# Patient Record
Sex: FEMALE | Race: BLACK OR AFRICAN AMERICAN | NOT HISPANIC OR LATINO | Employment: STUDENT | URBAN - METROPOLITAN AREA
[De-identification: names, ages, dates, MRNs, and addresses within clinical notes are randomized per-mention and may not be internally consistent; named-entity substitution may affect disease eponyms.]

---
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
## 2020-02-28 ENCOUNTER — TELEPHONE (OUTPATIENT)
Dept: ALLERGY | Facility: CLINIC | Age: 20
End: 2020-02-28

## 2020-02-28 NOTE — TELEPHONE ENCOUNTER
Dr. Moran,    Can you please send the Grastek prescription to [facility]? Looks like the original prescription was sent to [facility]. I was trying to see if it would be cheaper for the patient to get the medication from our pharmacy.

## 2020-03-03 DIAGNOSIS — J30.89 SEASONAL ALLERGIC RHINITIS DUE TO OTHER ALLERGIC TRIGGER: ICD-10-CM

## 2020-03-03 DIAGNOSIS — L30.9 ECZEMA, UNSPECIFIED TYPE: ICD-10-CM

## 2020-03-03 DIAGNOSIS — J45.20 MILD INTERMITTENT ASTHMA WITHOUT COMPLICATION: ICD-10-CM

## 2020-03-03 DIAGNOSIS — L20.9 ATOPIC DERMATITIS, UNSPECIFIED TYPE: ICD-10-CM

## 2020-03-03 RX ORDER — TIMOTHY GRASS POLLEN ALLERGEN EXTRACT 2800 [BAU]/1
1 TABLET SUBLINGUAL DAILY
Qty: 30 TABLET | Refills: 11 | Status: SHIPPED | OUTPATIENT
Start: 2020-03-03 | End: 2020-03-18

## 2020-03-18 DIAGNOSIS — J45.20 MILD INTERMITTENT ASTHMA WITHOUT COMPLICATION: ICD-10-CM

## 2020-03-18 DIAGNOSIS — J30.89 SEASONAL ALLERGIC RHINITIS DUE TO OTHER ALLERGIC TRIGGER: ICD-10-CM

## 2020-03-18 DIAGNOSIS — L20.9 ATOPIC DERMATITIS, UNSPECIFIED TYPE: ICD-10-CM

## 2020-03-18 DIAGNOSIS — L30.9 ECZEMA, UNSPECIFIED TYPE: ICD-10-CM

## 2020-03-19 RX ORDER — TIMOTHY GRASS POLLEN ALLERGEN EXTRACT 2800 [BAU]/1
1 TABLET SUBLINGUAL DAILY
Qty: 30 TABLET | Refills: 11 | Status: SHIPPED | OUTPATIENT
Start: 2020-03-19

## 2020-03-23 ENCOUNTER — TELEPHONE (OUTPATIENT)
Dept: PHARMACY | Facility: CLINIC | Age: 20
End: 2020-03-23

## 2020-03-23 NOTE — TELEPHONE ENCOUNTER
Informed Parent Berna  that [facility] received prescription for Grastek and prior authorization is required.  [facility] will be back in touch once insurance determination is received.    
normal (ped)...

## 2020-04-08 ENCOUNTER — TELEPHONE (OUTPATIENT)
Dept: PHARMACY | Facility: CLINIC | Age: 20
End: 2020-04-08

## 2020-04-08 NOTE — TELEPHONE ENCOUNTER
Juan Manuel MCCORMACK denied as plan exclusion. Appeal attempted. Response to appeal was that patient would be sent denial and appeal instructions. Call attempt to patient to request that appeal instructions be forwarded to [facility] once received. MANJIT.

## 2020-04-21 NOTE — TELEPHONE ENCOUNTER
Call attempt 3 to obtain denial details and appeal instructions from patient. LVM. Sent [employer].

## 2020-05-18 ENCOUNTER — TELEPHONE (OUTPATIENT)
Dept: PHARMACY | Facility: CLINIC | Age: 20
End: 2020-05-18

## 2020-05-18 NOTE — TELEPHONE ENCOUNTER
Call to assess receipt of denial letter. Mother answered call, verified patient name and . Mother states that she had not received letter yet, but that she would try to call again. She states that she will email to [facility] as soon as she gets the letter. No other questions or concerns. Mother agreed to a follow up call in a week.     Levi Jones, PharmD  Clinical Pharmacist  [facility]  P: [phone]

## 2020-08-06 ENCOUNTER — OFFICE VISIT (OUTPATIENT)
Dept: ALLERGY | Facility: CLINIC | Age: 20
End: 2020-08-06
Payer: COMMERCIAL

## 2020-08-06 DIAGNOSIS — L20.9 ATOPIC DERMATITIS, UNSPECIFIED TYPE: Primary | ICD-10-CM

## 2020-08-06 DIAGNOSIS — J30.89 SEASONAL ALLERGIC RHINITIS DUE TO OTHER ALLERGIC TRIGGER: ICD-10-CM

## 2020-08-06 DIAGNOSIS — Z88.9 DRUG ALLERGY: ICD-10-CM

## 2020-08-06 DIAGNOSIS — J45.20 MILD INTERMITTENT ASTHMA WITHOUT COMPLICATION: ICD-10-CM

## 2020-08-06 PROCEDURE — 99213 PR OFFICE/OUTPT VISIT, EST, LEVL III, 20-29 MIN: ICD-10-PCS | Mod: 95,,, | Performed by: ALLERGY & IMMUNOLOGY

## 2020-08-06 PROCEDURE — 99213 OFFICE O/P EST LOW 20 MIN: CPT | Mod: 95,,, | Performed by: ALLERGY & IMMUNOLOGY

## 2020-08-06 NOTE — PROGRESS NOTES
"The patient location is: home  The chief complaint leading to consultation is: eczema, allergies    Visit type: audiovisual    Face to Face time with patient: 15  25 minutes of total time spent on the encounter, which includes face to face time and non-face to face time preparing to see the patient (eg, review of tests), Obtaining and/or reviewing separately obtained history, Documenting clinical information in the electronic or other health record, Independently interpreting results (not separately reported) and communicating results to the patient/family/caregiver, or Care coordination (not separately reported).     Each patient to whom he or she provides medical services by telemedicine is:  (1) informed of the relationship between the physician and patient and the respective role of any other health care provider with respect to management of the patient; and (2) notified that he or she may decline to receive medical services by telemedicine and may withdraw from such care at any time.    Notes:   ALLERGY & IMMUNOLOGY CLINIC -  FOLLOW UP     HISTORY OF PRESENT ILLNESS     Patient ID: Jayme Kraus is a 20 y.o. female    CC: follow up    HPI: 21 yo girl with a long history of atopic dermatitis that is augmented by stress and known allergens (alejandra grass), presents for follow up for atopic dermatitis and allergic rhinitis.       Today she presents by herself for a virtual visit.  She is doing well since she was last seen in December 2019.  She moved home from college due to the pandemic, and she has maintained good control of her eczema.  She has found that she can tolerate dove soap instead of homemade goats milk soap that she has used previously, which is must less expensive. She treats flares with use of topical hydrocortisone ("Cortisone 10").  She also uses eucerin and aquaphor liberally.      She is currently taking zyrtec daily. She has completed her course of grastek and stopped taking this June 2020. "      She has a history of intermittent asthma for which she has not had to use her inhaler since she had a sinus infection in 2019.      REVIEW OF SYSTEMS     CONST: no F/C/NS, no unintentional weight changes  NEURO: no H/A, no weakness, no paresthesias  EYES: no discharge, no pruritus, no erythema  EARS: no hearing loss, no sensation of fullness  NOSE: no congestion, no rhinorrhea, no itching, no sneezing  PULM: no SOB, no wheezing, no cough  CV: no CP, no palpitations, no leg swelling  GI: no dysphagia, no heartburn, no pain, no N/V/D  MSK: no joint pain, no muscle pain  DERM: + rashes, no skin breaks     MEDICAL HISTORY      MedHx: eczema, asthma   SurgHx:  No significant surgical history                     SocHx: Attending college, will do the upcoming  remotely.  She is very involved with her [employer] major and is focused on school.  She completed the year with a 4.0 GPA. She is not around any pets or smoking at school.   FamHx: paternal grandmother  of colon cancer. Paternal grandmother has CHF. Maternal grandmother has CHF, and stroke. Father has eczema.              Allergies: multiple drug allergies  Medications: MAR reviewed     H/o Asthma: see above  H/o Eczema: (+) see above  Oral Allergy: n/a  Food Allergy:  Has lip swelling when she has mussels. Avoids some foods because she thinks they flare her skin.   Venom Allergy: denies  Latex Allergy: denies  Other Allergies: denies  Env/Occ: denies any environmental or occupational exposures      PHYSICAL EXAM      No vital signs were taken during this virtual visit.   GEN: Alert, cooperative, normal speech  HEENT: No ocular discharge, no nasal discharge, no hoarseness  PULM: Normal work of breathing, no cough  PSYCH: appropriate affect  DERM: no obvious rashes      ALLERGEN TESTING      Immunocaps: 2016 - significant positivity to grasses (alejandra and bermuda were the ones we measured). [place] of outdoor allergens with much  lower values of specific IgE, but  is clearly grasses.       ASSESSMENT & PLAN      Jayme Kraus is a 19 y.o. female with      1. ongoing atopic dermatitis, well-controlled, currently treated with hydrocortisone, aquaphor, and eucerin  2. Allergic rhinitis with grass as driving allergen  3. Intermittent asthma  4. History of antibiotic allergy, but just completed a course of amoxicillin  5. Recent sinusitis, currently improving s/p treatment with antibiotics and prednisone     Plan:   Completed about a three year course of gratek (8/2017 - 6/2020)   Continue zyrtec and flonase for nasal symptoms  Continue hydrocortisone, frequent use of emollients.   Post-pandemic, need to plan evaluation of medication allergies     Follow up: 1 year, sooner if needed     Brenna Moran MD  Allergy/Immunology

## 2020-09-25 ENCOUNTER — PATIENT MESSAGE (OUTPATIENT)
Dept: ALLERGY | Facility: CLINIC | Age: 20
End: 2020-09-25

## 2020-09-29 DIAGNOSIS — L70.9 ACNE, UNSPECIFIED ACNE TYPE: Primary | ICD-10-CM

## 2020-11-09 ENCOUNTER — PATIENT MESSAGE (OUTPATIENT)
Dept: DERMATOLOGY | Facility: CLINIC | Age: 20
End: 2020-11-09

## 2020-11-12 ENCOUNTER — PATIENT MESSAGE (OUTPATIENT)
Dept: DERMATOLOGY | Facility: CLINIC | Age: 20
End: 2020-11-12

## 2020-11-12 ENCOUNTER — OFFICE VISIT (OUTPATIENT)
Dept: DERMATOLOGY | Facility: CLINIC | Age: 20
End: 2020-11-12
Payer: COMMERCIAL

## 2020-11-12 DIAGNOSIS — L70.0 ACNE VULGARIS: Primary | ICD-10-CM

## 2020-11-12 PROCEDURE — 99202 OFFICE O/P NEW SF 15 MIN: CPT | Mod: 95,,, | Performed by: DERMATOLOGY

## 2020-11-12 PROCEDURE — 99202 PR OFFICE/OUTPT VISIT, NEW, LEVL II, 15-29 MIN: ICD-10-PCS | Mod: 95,,, | Performed by: DERMATOLOGY

## 2020-11-12 RX ORDER — TRETINOIN 0.5 MG/G
CREAM TOPICAL
Qty: 45 G | Refills: 5 | Status: SHIPPED | OUTPATIENT
Start: 2020-11-12

## 2020-11-12 NOTE — PROGRESS NOTES
Subjective:       Patient ID:  Jayme Kraus is a 20 y.o. female who presents for No chief complaint on file.    Initial visit  C/o acne on face, worsened in th epast year  Black heads and inflammatory lesions, pus bumps  Face only, chest and back are fine per patient    On medroxyprogesterone 10 days per month PO      Current Outpatient Medications:     albuterol (PROVENTIL/VENTOLIN HFA) 90 mcg/actuation inhaler, Inhale 2 puffs into the lungs every 4 (four) hours as needed for Wheezing., Disp: 18 g, Rfl: 3    ammonium lactate 12 % Crea, Apply 1 application topically 2 (two) times daily., Disp: 385 g, Rfl: 11    benzonatate (TESSALON) 200 MG capsule, Take 200 mg by mouth 3 (three) times daily as needed for Cough., Disp: , Rfl:     blood-glucose meter kit, Use as instructed, Disp: , Rfl:     calcium acetate-aluminum sulf 952-1,347 mg 952-1,347 mg PwPk, Apply 1 packet topically., Disp: , Rfl:     cetirizine (ZYRTEC) 10 MG tablet, Take 10 mg by mouth once daily., Disp: , Rfl:     COCONUT OIL TOP, Apply topically., Disp: , Rfl:     epinephrine (EPIPEN 2-SHAHRIAR) 0.3 mg/0.3 mL AtIn, Inject 0.3 mLs (0.3 mg total) into the muscle once., Disp: 0.3 mL, Rfl: 0    fluticasone propionate (FLONASE) 50 mcg/actuation nasal spray, 2 sprays (100 mcg total) by Each Nare route once daily., Disp: 16 g, Rfl: 11    GRASTEK 2,800 BAU Subl, Place 1 tablet under the tongue once daily., Disp: 30 tablet, Rfl: 11    hydrocortisone (CORTISONE, HYDROCORTISONE,) 1 % cream, Apply topically 2 (two) times daily., Disp: 454 g, Rfl: 11    ibuprofen (ADVIL,MOTRIN) 200 MG tablet, Take 200 mg by mouth every 6 (six) hours as needed for Pain., Disp: , Rfl:     medroxyPROGESTERone (PROVERA) 10 MG tablet, Take 1 tablet (10 mg total) by mouth once daily., Disp: 30 tablet, Rfl: 3    mineral oil-hydrophil petrolat (AQUAPHOR) Oint, Apply topically 4 (four) times daily as needed., Disp: , Rfl:     mometasone (NASONEX) 50 mcg/actuation nasal spray, 2  sprays by Nasal route., Disp: , Rfl:     oxymetazoline (AFRIN) 0.05 % nasal spray, 2 sprays by Nasal route 2 (two) times daily., Disp: , Rfl:     predniSONE (DELTASONE) 20 MG tablet, Take 20 mg by mouth once daily., Disp: , Rfl:     white petrolatum-mineral oil (EUCERIN) Crea, Apply topically as needed., Disp: , Rfl:     The patient location is: home  The chief complaint leading to consultation is: acne    Visit type: audiovisual    Face to Face time with patient: 15min  20 minutes of total time spent on the encounter, which includes face to face time and non-face to face time preparing to see the patient (eg, review of tests), Obtaining and/or reviewing separately obtained history, Documenting clinical information in the electronic or other health record, Independently interpreting results (not separately reported) and communicating results to the patient/family/caregiver, or Care coordination (not separately reported).     Each patient to whom he or she provides medical services by telemedicine is:  (1) informed of the relationship between the physician and patient and the respective role of any other health care provider with respect to management of the patient; and (2) notified that he or she may decline to receive medical services by telemedicine and may withdraw from such care at any time.            Review of Systems   Constitutional: Negative for fever and chills.   Respiratory: Negative for cough and shortness of breath.         Objective:    Physical Exam   Constitutional: She appears well-developed and well-nourished. No distress.   Neurological: She is alert and oriented to person, place, and time. She is not disoriented.   Psychiatric: She has a normal mood and affect.   Skin:   Areas Examined (abnormalities noted in diagram):   Head / Face Inspection Performed  Neck Inspection Performed              Diagram Legend     Erythematous scaling macule/papule c/w actinic keratosis       Vascular papule  c/w angioma      Pigmented verrucoid papule/plaque c/w seborrheic keratosis      Yellow umbilicated papule c/w sebaceous hyperplasia      Irregularly shaped tan macule c/w lentigo     1-2 mm smooth white papules consistent with Milia      Movable subcutaneous cyst with punctum c/w epidermal inclusion cyst      Subcutaneous movable cyst c/w pilar cyst      Firm pink to brown papule c/w dermatofibroma      Pedunculated fleshy papule(s) c/w skin tag(s)      Evenly pigmented macule c/w junctional nevus     Mildly variegated pigmented, slightly irregular-bordered macule c/w mildly atypical nevus      Flesh colored to evenly pigmented papule c/w intradermal nevus       Pink pearly papule/plaque c/w basal cell carcinoma      Erythematous hyperkeratotic cursted plaque c/w SCC      Surgical scar with no sign of skin cancer recurrence      Open and closed comedones      Inflammatory papules and pustules      Verrucoid papule consistent consistent with wart     Erythematous eczematous patches and plaques     Dystrophic onycholytic nail with subungual debris c/w onychomycosis     Umbilicated papule    Erythematous-base heme-crusted tan verrucoid plaque consistent with inflamed seborrheic keratosis     Erythematous Silvery Scaling Plaque c/w Psoriasis     See annotation                  Assessment / Plan:        Acne vulgaris  -     tretinoin (RETIN-A) 0.05 % cream; Thin film to entire face at bedtime  Dispense: 45 g; Refill: 5    comedonal  Trial of above  On medroxyprogesterone for cramps and menorrhagia, probably contributes  Wears mask 6hrs per day    Discussed role of diet, hormones, contraceptive method  Anticipated treatment course and OTC products to supplement treatment (BP and sal acid washes)  Handout in AVS         Follow up in about 3 months (around 2/12/2021).

## 2020-11-12 NOTE — PATIENT INSTRUCTIONS
Acne Treatment     Retinoids (e.g. adapalene, tretinoin, tazarotene) are vitamin A derivatives that are the mainstay of acne therapy. The skin often becomes dry, red, or irritated when first using them--this is a normal period of adjustment.   o Use only a pea-sized amount for the entire face to avoid excess irritation.   o If your skin is sensitive, begin by using the medication two nights per week or every other night for the first couple of months until your skin adjusts.   o Use as much oil-free moisturizer as needed to help your skin adjust to the retinoid.   There is no miracle, overnight cure for acne. It may take 6-8 weeks to start seeing some improvement, and you should continue to improve over the following months. It is important that you keep your follow up appointments so that any medication changes can be made if necessary.   Your acne may get worse before it gets better. This is normal! Just hang in there, incorporate the meds into your daily routine, and trust that the medication will work.    Do not scrub your face. Aggressive scrubbing can make acne worse.   Do not pick or squeeze your pimples, as this will cause scarring. Acne is temporary, but scars are permanent.   Antibiotics: Antibiotics are sometimes prescribed to decrease acne by reducing inflammation. They are not a good long-term solution; they have side effects as all meds do; and they should always be used along with the topical treatments that are recommended.   Waxing: Stop using the retinoid 1 week prior to any waxing, as skin is more likely to tear.   Diet: Avoid eating foods with a high glycemic load/index (high sugar, simple carbs) which can worsen acne. Also, avoid drinking a lot of skim or low fat milk, and avoid the whey protein found in most protein shakes and bars, as these can also worsen acne.   Makeup: Use only oil-free, non-comedogenic makeup. Brands to consider include Neutrogena, Tarte, Bare Minerals, Safia  [place], Maru Evangelina, Clinique. (Avoid MAC.)   For female patients: Discontinue all oral and topical acne medications if you become pregnant or are planning to become pregnant. Notify our office, and we will direct you to medications that are safe to use during pregnancy.    Morning acne regimen:  ?  1. Wash face with benzoyl peroxide cleanser in the shower. See below for suggestions.  ?  2. Apply a moisturizer broad-spectrum sunscreen with SPF 30 or higher.    Evening acne regimen:  ?  1. Wash face with salicylic acid cleanser. See below for suggestions.  ?  2. Apply a pea-sized amount of tretinoin cream (retinoid) to the entire face.  ?  4. Apply a fragrance-free moisturizer, such as CeraVe PM lotion.    Cleanser options:  o Gentle cleansers: CeraVe foaming wash, CeraVe hydrating cleanser, Neutrogena Ultra Gentle cleanser, Cetaphil cleanser  o Benzoyl peroxide (2-5%): PanOxyl 4% Creamy Wash, Neutrogena Clear Pore Cleanser/Mask, cerave acne foaming cream cleanser             *Note that benzoyl peroxide can bleach towels, sheets, and clothing if not rinsed well from the skin. May be best to keep in the shower.*  o Salicylic acid (0.5-2%): CeraVe Renewing SA Cleanser, Neutrogena Oil-Free Acne Wash, Neutrogena Acne Proofing Gel Cleanser

## 2022-05-05 NOTE — TELEPHONE ENCOUNTER
----- Message from Sofia Edmonds sent at 8/1/2018  3:16 PM CDT -----  Contact: [phone]  Patient's dad stated he is returning a call from nurse . Please call and advise, Thanks   
See portal message. Looks like Leatha HEBERT Has been in contact with parents.  
84.8
Helical Rim Text: The closure involved the helical rim.

## 2023-03-03 ENCOUNTER — TELEPHONE (OUTPATIENT)
Dept: ALLERGY | Facility: CLINIC | Age: 23
End: 2023-03-03
Payer: COMMERCIAL

## 2023-03-03 NOTE — TELEPHONE ENCOUNTER
I tried to return call to patient. No answer, message left for patient to return our call regarding an appointment.

## 2023-03-03 NOTE — TELEPHONE ENCOUNTER
"----- Message from Inessa Ramirez sent at 3/3/2023 12:19 PM CST -----  Regarding: Appointment  "Type:  Patient Call Back    Who Called:PT    What is the reqeust in detail:Requesting call back to schedule an appointment. Please advise    Can the clinic reply by [facility]?no    Best Call Back Number:[phone]      Additional Information:            "

## 2023-04-11 ENCOUNTER — OFFICE VISIT (OUTPATIENT)
Dept: DERMATOLOGY | Facility: CLINIC | Age: 23
End: 2023-04-11
Payer: COMMERCIAL

## 2023-04-11 VITALS — WEIGHT: 154 LBS | HEIGHT: 67 IN | BODY MASS INDEX: 24.17 KG/M2

## 2023-04-11 DIAGNOSIS — L70.0 ACNE VULGARIS: Primary | ICD-10-CM

## 2023-04-11 DIAGNOSIS — L81.9 DYSPIGMENTATION: ICD-10-CM

## 2023-04-11 PROCEDURE — 99214 PR OFFICE/OUTPT VISIT, EST, LEVL IV, 30-39 MIN: ICD-10-PCS | Mod: S$GLB,,, | Performed by: STUDENT IN AN ORGANIZED HEALTH CARE EDUCATION/TRAINING PROGRAM

## 2023-04-11 PROCEDURE — 99214 OFFICE O/P EST MOD 30 MIN: CPT | Mod: S$GLB,,, | Performed by: STUDENT IN AN ORGANIZED HEALTH CARE EDUCATION/TRAINING PROGRAM

## 2023-04-11 PROCEDURE — 99999 PR PBB SHADOW E&M-EST. PATIENT-LVL V: CPT | Mod: PBBFAC,,, | Performed by: STUDENT IN AN ORGANIZED HEALTH CARE EDUCATION/TRAINING PROGRAM

## 2023-04-11 PROCEDURE — 99999 PR PBB SHADOW E&M-EST. PATIENT-LVL V: ICD-10-PCS | Mod: PBBFAC,,, | Performed by: STUDENT IN AN ORGANIZED HEALTH CARE EDUCATION/TRAINING PROGRAM

## 2023-04-11 RX ORDER — TRETINOIN 0.5 MG/G
CREAM TOPICAL NIGHTLY
Qty: 45 G | Refills: 1 | Status: SHIPPED | OUTPATIENT
Start: 2023-04-11

## 2023-04-11 RX ORDER — CLINDAMYCIN AND BENZOYL PEROXIDE 10; 50 MG/G; MG/G
GEL TOPICAL EVERY MORNING
Qty: 50 G | Refills: 1 | Status: SHIPPED | OUTPATIENT
Start: 2023-04-11 | End: 2023-10-17 | Stop reason: SDUPTHER

## 2023-04-11 NOTE — PROGRESS NOTES
Subjective:      Patient ID:  Jayme Kraus is a 23 y.o. female who presents for   Chief Complaint   Patient presents with    Acne     Face,      LOV- 20-Pb- virtual    Here today for acne on face since high school  Using OTC products-cerave and clinique foaming cleanser  Was using tretinoin but has - was helpful    She takes provera.     When she took doxycycline her stomach was upset.       Has no hx of NMSC  Has no fhx of MM      Review of Systems   Constitutional:  Negative for fever and chills.   Skin:  Negative for itching, rash and dry skin.     Objective:   Physical Exam     Diagram Legend     Erythematous scaling macule/papule c/w actinic keratosis       Vascular papule c/w angioma      Pigmented verrucoid papule/plaque c/w seborrheic keratosis      Yellow umbilicated papule c/w sebaceous hyperplasia      Irregularly shaped tan macule c/w lentigo     1-2 mm smooth white papules consistent with Milia      Movable subcutaneous cyst with punctum c/w epidermal inclusion cyst      Subcutaneous movable cyst c/w pilar cyst      Firm pink to brown papule c/w dermatofibroma      Pedunculated fleshy papule(s) c/w skin tag(s)      Evenly pigmented macule c/w junctional nevus     Mildly variegated pigmented, slightly irregular-bordered macule c/w mildly atypical nevus      Flesh colored to evenly pigmented papule c/w intradermal nevus       Pink pearly papule/plaque c/w basal cell carcinoma      Erythematous hyperkeratotic cursted plaque c/w SCC      Surgical scar with no sign of skin cancer recurrence      Open and closed comedones      Inflammatory papules and pustules      Verrucoid papule consistent consistent with wart     Erythematous eczematous patches and plaques     Dystrophic onycholytic nail with subungual debris c/w onychomycosis     Umbilicated papule    Erythematous-base heme-crusted tan verrucoid plaque consistent with inflamed seborrheic keratosis     Erythematous Silvery Scaling  Plaque c/w Psoriasis     See annotation      Assessment / Plan:        Acne vulgaris  Dyspigmentation  -     clindamycin-benzoyl peroxide (BENZACLIN) gel; Apply topically every morning.  Dispense: 50 g; Refill: 1  -     tretinoin (RETIN-A) 0.05 % cream; Apply topically every evening.  Dispense: 45 g; Refill: 1  Discussed topicals alone vs. Spironolactone vs. Isotretinoin, she would like to start with topicals only  - reviewed side effects of tretinoin including erythema, peeling/scaling, dryness, burning, pruritus, photosensitivity, temporary worsening of acne. Reviewed that skin irritation consisting of erythema and peeling may occur during the first month of treatment. If this occurs instructed to use moisturizer and decrease tretinoin use to 3 nights per week until side effects subside and then increase use to daily as tolerated.   Hand out on acne given  Stop cerave SA   Start cerave hydrating cleanser  Strict sun protection with cerave tinted mineral   Medroxyprogesterone may be worsening acne             No follow-ups on file.

## 2023-04-11 NOTE — PATIENT INSTRUCTIONS

## 2023-10-17 ENCOUNTER — OFFICE VISIT (OUTPATIENT)
Dept: DERMATOLOGY | Facility: CLINIC | Age: 23
End: 2023-10-17
Payer: COMMERCIAL

## 2023-10-17 DIAGNOSIS — L81.9 DYSPIGMENTATION: Primary | ICD-10-CM

## 2023-10-17 DIAGNOSIS — L20.84 INTRINSIC ATOPIC DERMATITIS: ICD-10-CM

## 2023-10-17 DIAGNOSIS — L70.0 ACNE VULGARIS: ICD-10-CM

## 2023-10-17 PROCEDURE — 99214 OFFICE O/P EST MOD 30 MIN: CPT | Mod: S$GLB,,, | Performed by: STUDENT IN AN ORGANIZED HEALTH CARE EDUCATION/TRAINING PROGRAM

## 2023-10-17 PROCEDURE — 99214 PR OFFICE/OUTPT VISIT, EST, LEVL IV, 30-39 MIN: ICD-10-PCS | Mod: S$GLB,,, | Performed by: STUDENT IN AN ORGANIZED HEALTH CARE EDUCATION/TRAINING PROGRAM

## 2023-10-17 RX ORDER — CLINDAMYCIN AND BENZOYL PEROXIDE 10; 50 MG/G; MG/G
GEL TOPICAL EVERY MORNING
Qty: 50 G | Refills: 1 | Status: SHIPPED | OUTPATIENT
Start: 2023-10-17 | End: 2024-10-16

## 2023-10-17 RX ORDER — BUSPIRONE HYDROCHLORIDE 5 MG/1
5 TABLET ORAL 2 TIMES DAILY PRN
COMMUNITY
Start: 2023-10-13

## 2023-10-17 RX ORDER — TRIAMCINOLONE ACETONIDE 0.25 MG/G
OINTMENT TOPICAL
Qty: 80 G | Refills: 1 | Status: SHIPPED | OUTPATIENT
Start: 2023-10-17

## 2023-10-17 RX ORDER — TRETINOIN 0.25 MG/G
CREAM TOPICAL NIGHTLY
Qty: 20 G | Refills: 3 | Status: SHIPPED | OUTPATIENT
Start: 2023-10-17

## 2023-10-17 NOTE — PROGRESS NOTES
Subjective:      Patient ID:  Jayme Kraus is a 23 y.o. female who presents for   Chief Complaint   Patient presents with    Acne     Follow up and eczema     LOV 04/11/2023    Patient coming in today for follow up on acne. She never picked up her tretinoin 0.05% cream but has been using clindamycin- benzoyl peroxide gel every morning. Her acne is clearing but now she has dark spots. She is using cerave AM sunscreen. It has a white tint to it.   She also wants to discuss eczema. Has been followed for years by allergy for eczema and allergic rhinitis. Had three year course of gratek (8/2017 - 6/2020)     She is currently using cerave lotion.   She does not use shave cream- uses coconut oil      Derm Hx  Denies Phx NMSC  Denies Fhx MM        Review of Systems   Constitutional:  Negative for fever, chills and fatigue.   Respiratory:  Negative for cough and shortness of breath.    Gastrointestinal:  Negative for nausea, vomiting and diarrhea.   Skin:  Positive for daily sunscreen use and activity-related sunscreen use.       Objective:   Physical Exam   Constitutional: She appears well-developed and well-nourished.   Neurological: She is alert and oriented to person, place, and time.   Psychiatric: She has a normal mood and affect.   Skin:   Areas Examined (abnormalities noted in diagram):   Head / Face Inspection Performed  RUE Inspected  LUE Inspection Performed  RLE Inspected  LLE Inspection Performed                 Diagram Legend     Erythematous scaling macule/papule c/w actinic keratosis       Vascular papule c/w angioma      Pigmented verrucoid papule/plaque c/w seborrheic keratosis      Yellow umbilicated papule c/w sebaceous hyperplasia      Irregularly shaped tan macule c/w lentigo     1-2 mm smooth white papules consistent with Milia      Movable subcutaneous cyst with punctum c/w epidermal inclusion cyst      Subcutaneous movable cyst c/w pilar cyst      Firm pink to brown papule c/w dermatofibroma       Pedunculated fleshy papule(s) c/w skin tag(s)      Evenly pigmented macule c/w junctional nevus     Mildly variegated pigmented, slightly irregular-bordered macule c/w mildly atypical nevus      Flesh colored to evenly pigmented papule c/w intradermal nevus       Pink pearly papule/plaque c/w basal cell carcinoma      Erythematous hyperkeratotic cursted plaque c/w SCC      Surgical scar with no sign of skin cancer recurrence      Open and closed comedones      Inflammatory papules and pustules      Verrucoid papule consistent consistent with wart     Erythematous eczematous patches and plaques     Dystrophic onycholytic nail with subungual debris c/w onychomycosis     Umbilicated papule    Erythematous-base heme-crusted tan verrucoid plaque consistent with inflamed seborrheic keratosis     Erythematous Silvery Scaling Plaque c/w Psoriasis     See annotation      Assessment / Plan:        Dyspigmentation  Acne vulgaris  -     tretinoin (RETIN-A) 0.025 % cream; Apply topically every evening.  Dispense: 20 g; Refill: 3  -     clindamycin-benzoyl peroxide (BENZACLIN) gel; Apply topically every morning.  Dispense: 50 g; Refill: 1  Switch to the neutrogena mineral UV tinted sunscreen - pick color that matches skin tone  Start tretinoin with moisturizer   - reviewed side effects of tretinoin including erythema, peeling/scaling, dryness, burning, pruritus, photosensitivity, temporary worsening of acne. Reviewed that skin irritation consisting of erythema and peeling may occur during the first month of treatment. If this occurs instructed to use moisturizer and decrease tretinoin use to 3 nights per week until side effects subside and then increase use to daily as tolerated.     Intrinsic atopic dermatitis  -     triamcinolone acetonide 0.025% (KENALOG) 0.025 % Oint; Use on AA twice daily x 10 days  Dispense: 80 g; Refill: 1  Arms and legs today, mild- moderate, lots of dyspigmentation  Switch from a lotion to cerave or  vanicream moisturizing cream at least once daily  Use tac BID x 10 days for flares  Recommend using shave cream and moisturizing immediately after shaving             No follow-ups on file.

## 2023-10-17 NOTE — PATIENT INSTRUCTIONS
Recommend using a shave cream in thick lather before shaving legs  Moisturize right after with cerave or vanicream moisturizing cream    When eczema is flaring apply triamcinolone ointment twice daily x 10 days when moisturizer on top

## 2024-03-11 ENCOUNTER — TELEPHONE (OUTPATIENT)
Dept: DERMATOLOGY | Facility: CLINIC | Age: 24
End: 2024-03-11
Payer: COMMERCIAL

## 2024-03-11 NOTE — TELEPHONE ENCOUNTER
Returned call to patient, patient scheduled.     ----- Message from Tami Vail sent at 3/11/2024 12:20 PM CDT -----  Regarding: virtual apt needed  Contact: patient  Type:  Sooner Appointment Request    Caller is requesting a sooner appointment.  Caller declined first available appointment listed below.  Caller will not accept being placed on the waitlist and is requesting a message be sent to doctor.    Name of Caller:  patient  When is the first available appointment?    Symptoms:  eczema  Would the patient rather a call back or a response via [facility]?   Best Call Back Number:  [phone]  Additional Information:  seeking virtual call to advise

## 2024-04-16 ENCOUNTER — OFFICE VISIT (OUTPATIENT)
Dept: DERMATOLOGY | Facility: CLINIC | Age: 24
End: 2024-04-16
Payer: COMMERCIAL

## 2024-04-16 DIAGNOSIS — L70.0 ACNE VULGARIS: ICD-10-CM

## 2024-04-16 DIAGNOSIS — L28.0 LICHEN SIMPLEX CHRONICUS: Primary | ICD-10-CM

## 2024-04-16 DIAGNOSIS — L20.84 INTRINSIC ECZEMA: ICD-10-CM

## 2024-04-16 DIAGNOSIS — L81.9 HYPERPIGMENTATION: ICD-10-CM

## 2024-04-16 PROCEDURE — 99214 OFFICE O/P EST MOD 30 MIN: CPT | Mod: S$GLB,,, | Performed by: STUDENT IN AN ORGANIZED HEALTH CARE EDUCATION/TRAINING PROGRAM

## 2024-04-16 RX ORDER — CLOBETASOL PROPIONATE 0.5 MG/G
OINTMENT TOPICAL 2 TIMES DAILY
Qty: 60 G | Refills: 0 | Status: SHIPPED | OUTPATIENT
Start: 2024-04-16

## 2024-04-16 RX ORDER — CLINDAMYCIN AND BENZOYL PEROXIDE 10; 50 MG/G; MG/G
GEL TOPICAL EVERY MORNING
Qty: 50 G | Refills: 1 | Status: SHIPPED | OUTPATIENT
Start: 2024-04-16 | End: 2025-04-16

## 2024-04-16 RX ORDER — TRETINOIN 0.25 MG/G
CREAM TOPICAL NIGHTLY
Qty: 20 G | Refills: 3 | Status: SHIPPED | OUTPATIENT
Start: 2024-04-16

## 2024-04-16 NOTE — PROGRESS NOTES
Subjective:      Patient ID:  Jayme Kraus is a 24 y.o. female who presents for   Chief Complaint   Patient presents with    Eczema     LOV 10/17/23    Patient here today for f/u on eczema. Patient states she is continuing to flare, has new area on right dorsal hand. Not itchy today but has been in the past. Used triamcinolone ointment for approx 10 days, did improve but has tried to take breaks.   Moisturizing daily.   Has minimal water exposure on hands, denies occupational exposures    Has been followed for years by allergy for eczema and allergic rhinitis. Had three year course of gratek (8/2017 - 6/2020)      She is currently using cerave lotion.   She does not use shave cream- uses coconut oil    Also f/u on acne on face. States acne has improved. Using clindamycin BP gel and tretinoin.           Review of Systems   Constitutional:  Negative for fever, chills and fatigue.   Respiratory:  Negative for cough and shortness of breath.    Gastrointestinal:  Negative for nausea and vomiting.   Skin:  Positive for itching and rash.       Objective:   Physical Exam   Constitutional: She appears well-developed and well-nourished.   Neurological: She is alert and oriented to person, place, and time.   Psychiatric: She has a normal mood and affect.   Skin:   Areas Examined (abnormalities noted in diagram):   Head / Face Inspection Performed  RUE Inspected  LUE Inspection Performed  RLE Inspected  LLE Inspection Performed                 Diagram Legend     Erythematous scaling macule/papule c/w actinic keratosis       Vascular papule c/w angioma      Pigmented verrucoid papule/plaque c/w seborrheic keratosis      Yellow umbilicated papule c/w sebaceous hyperplasia      Irregularly shaped tan macule c/w lentigo     1-2 mm smooth white papules consistent with Milia      Movable subcutaneous cyst with punctum c/w epidermal inclusion cyst      Subcutaneous movable cyst c/w pilar cyst      Firm pink to brown papule c/w  dermatofibroma      Pedunculated fleshy papule(s) c/w skin tag(s)      Evenly pigmented macule c/w junctional nevus     Mildly variegated pigmented, slightly irregular-bordered macule c/w mildly atypical nevus      Flesh colored to evenly pigmented papule c/w intradermal nevus       Pink pearly papule/plaque c/w basal cell carcinoma      Erythematous hyperkeratotic cursted plaque c/w SCC      Surgical scar with no sign of skin cancer recurrence      Open and closed comedones      Inflammatory papules and pustules      Verrucoid papule consistent consistent with wart     Erythematous eczematous patches and plaques     Dystrophic onycholytic nail with subungual debris c/w onychomycosis     Umbilicated papule    Erythematous-base heme-crusted tan verrucoid plaque consistent with inflamed seborrheic keratosis     Erythematous Silvery Scaling Plaque c/w Psoriasis     See annotation      Assessment / Plan:        Jayme was seen today for eczema.    Diagnoses and all orders for this visit:    Lichen simplex chronicus  -     clobetasol 0.05% (TEMOVATE) 0.05 % Oint; Apply topically 2 (two) times daily.  Right dorsal hand  Can use clobetasol ointment BID x 10-14 days or nightly under occlusion   Avoid scratching  - patient counseled to use topical steroids for specified period of time and on locations discussed to prevent side effects. Reviewed side effects of long-term use of topical steroids which include thinning and lightening of skin    Acne vulgaris  -     tretinoin (RETIN-A) 0.025 % cream; Apply topically every evening.  -     clindamycin-benzoyl peroxide (BENZACLIN) gel; Apply topically every morning.    Intrinsic eczema  Hyperpigmentation  Arms and legs w/ hyperpigmentation, no active outbreak other than right dorsal hand  Continue to moisturize with cerave daily   Sun protection, avoid scratching  Can use tac 0.025% cream BID x 7-10 days when flaring       PRN    No follow-ups on file.

## 2024-09-24 ENCOUNTER — TELEPHONE (OUTPATIENT)
Dept: DERMATOLOGY | Facility: CLINIC | Age: 24
End: 2024-09-24
Payer: COMMERCIAL

## 2024-09-24 NOTE — TELEPHONE ENCOUNTER
Pt contacted and appt scheduled and added to the wait list.  Pt advised to contact her PCP or go to the Urgent Care if she feels like she has an infection.  Pt voiced understanding.

## 2024-09-24 NOTE — TELEPHONE ENCOUNTER
----- Message from Elda Forte sent at 9/24/2024  8:48 AM CDT -----  Contact: Patient  Type:  Same Day Appointment Request    Caller is requesting a same day appointment.  Caller declined first available appointment listed below.    Name of Caller:Patient     When is the first available appointment?Feb 3rd    Symptoms:eczema/ new location/ possibly infected    Best Call Back Number:[phone] (home)     Additional Information: Please call to advise

## 2024-11-12 ENCOUNTER — OFFICE VISIT (OUTPATIENT)
Dept: DERMATOLOGY | Facility: CLINIC | Age: 24
End: 2024-11-12
Payer: COMMERCIAL

## 2024-11-12 DIAGNOSIS — L20.84 INTRINSIC ATOPIC DERMATITIS: ICD-10-CM

## 2024-11-12 DIAGNOSIS — L28.0 LICHEN SIMPLEX CHRONICUS: ICD-10-CM

## 2024-11-12 DIAGNOSIS — L70.0 ACNE VULGARIS: Primary | ICD-10-CM

## 2024-11-12 DIAGNOSIS — L81.9 HYPERPIGMENTATION: ICD-10-CM

## 2024-11-12 DIAGNOSIS — L01.1 SECONDARY IMPETIGINIZATION: ICD-10-CM

## 2024-11-12 PROCEDURE — 99213 OFFICE O/P EST LOW 20 MIN: CPT | Mod: S$GLB,,, | Performed by: STUDENT IN AN ORGANIZED HEALTH CARE EDUCATION/TRAINING PROGRAM

## 2024-11-12 RX ORDER — TRIAMCINOLONE ACETONIDE 0.25 MG/G
OINTMENT TOPICAL
Qty: 80 G | Refills: 1 | Status: SHIPPED | OUTPATIENT
Start: 2024-11-12

## 2024-11-12 RX ORDER — CLOBETASOL PROPIONATE 0.5 MG/G
OINTMENT TOPICAL 2 TIMES DAILY
Qty: 60 G | Refills: 3 | Status: SHIPPED | OUTPATIENT
Start: 2024-11-12

## 2024-11-12 RX ORDER — CLINDAMYCIN AND BENZOYL PEROXIDE 10; 50 MG/G; MG/G
GEL TOPICAL EVERY MORNING
Qty: 50 G | Refills: 4 | Status: SHIPPED | OUTPATIENT
Start: 2024-11-12 | End: 2025-11-12

## 2024-11-12 RX ORDER — TRETINOIN 0.25 MG/G
CREAM TOPICAL NIGHTLY
Qty: 20 G | Refills: 4 | Status: SHIPPED | OUTPATIENT
Start: 2024-11-12

## 2024-11-12 RX ORDER — CITALOPRAM 10 MG/1
10 TABLET ORAL
COMMUNITY
Start: 2024-10-30

## 2024-11-12 RX ORDER — MUPIROCIN 20 MG/G
OINTMENT TOPICAL
Qty: 22 G | Refills: 4 | Status: SHIPPED | OUTPATIENT
Start: 2024-11-12

## 2024-11-12 NOTE — PROGRESS NOTES
Subjective:      Patient ID:  Jayme Kraus is a 24 y.o. female who presents for   Chief Complaint   Patient presents with    Acne     Follow up     LOV 04/16/2024    Patient here today for f/u on eczema. Patient states that it has calmed down. States that when she get plaques she will use the clobetasol 0.05% onit for 2-3 days until it has cleared. Uses TAC on smaller plaques.   Moisturizing daily.   Has minimal water exposure on hands, denies occupational exposures     Uses cerave cream daily, no breakouts right now.     Has been followed for years by allergy for eczema and allergic rhinitis. Had three year course of gratek (8/2017 - 6/2020)      Also f/u on acne on face. States acne has improved. Using clindamycin BP gel and tretinoin.         Lichen simplex chronicus  -     clobetasol 0.05% (TEMOVATE) 0.05 % Oint; Apply topically 2 (two) times daily.  Right dorsal hand  Can use clobetasol ointment BID x 10-14 days or nightly under occlusion   Avoid scratching  - patient counseled to use topical steroids for specified period of time and on locations discussed to prevent side effects. Reviewed side effects of long-term use of topical steroids which include thinning and lightening of skin     Acne vulgaris  -     tretinoin (RETIN-A) 0.025 % cream; Apply topically every evening.  -     clindamycin-benzoyl peroxide (BENZACLIN) gel; Apply topically every morning.     Intrinsic eczema  Hyperpigmentation  Arms and legs w/ hyperpigmentation, no active outbreak other than right dorsal hand  Continue to moisturize with cerave daily   Sun protection, avoid scratching  Can use tac 0.025% cream BID x 7-10 days when flaring          Review of Systems   Constitutional:  Negative for fever, chills and fatigue.   Respiratory:  Negative for cough and shortness of breath.    Gastrointestinal:  Negative for nausea and vomiting.   Skin:  Positive for itching and rash.       Objective:   Physical Exam   Constitutional: She appears  well-developed and well-nourished.   Neurological: She is alert and oriented to person, place, and time.   Psychiatric: She has a normal mood and affect.   Skin:   Areas Examined (abnormalities noted in diagram):   Head / Face Inspection Performed  Neck Inspection Performed                 Diagram Legend     Erythematous scaling macule/papule c/w actinic keratosis       Vascular papule c/w angioma      Pigmented verrucoid papule/plaque c/w seborrheic keratosis      Yellow umbilicated papule c/w sebaceous hyperplasia      Irregularly shaped tan macule c/w lentigo     1-2 mm smooth white papules consistent with Milia      Movable subcutaneous cyst with punctum c/w epidermal inclusion cyst      Subcutaneous movable cyst c/w pilar cyst      Firm pink to brown papule c/w dermatofibroma      Pedunculated fleshy papule(s) c/w skin tag(s)      Evenly pigmented macule c/w junctional nevus     Mildly variegated pigmented, slightly irregular-bordered macule c/w mildly atypical nevus      Flesh colored to evenly pigmented papule c/w intradermal nevus       Pink pearly papule/plaque c/w basal cell carcinoma      Erythematous hyperkeratotic cursted plaque c/w SCC      Surgical scar with no sign of skin cancer recurrence      Open and closed comedones      Inflammatory papules and pustules      Verrucoid papule consistent consistent with wart     Erythematous eczematous patches and plaques     Dystrophic onycholytic nail with subungual debris c/w onychomycosis     Umbilicated papule    Erythematous-base heme-crusted tan verrucoid plaque consistent with inflamed seborrheic keratosis     Erythematous Silvery Scaling Plaque c/w Psoriasis     See annotation      Assessment / Plan:        Acne vulgaris  -     clindamycin-benzoyl peroxide (BENZACLIN) gel; Apply topically every morning.  Dispense: 50 g; Refill: 4  -     tretinoin (RETIN-A) 0.025 % cream; Apply topically every evening.  Dispense: 20 g; Refill: 4  Clear today    Secondary  impetiginization  -     mupirocin (BACTROBAN) 2 % ointment; Use in nose 5 days of the month  Dispense: 22 g; Refill: 4  No active infection, discussed decolonization practices- continue bleach baths, discussed hibiclens, intranasal mupirocin  Lichen simplex chronicus  -     clobetasol 0.05% (TEMOVATE) 0.05 % Oint; Apply topically 2 (two) times daily.  Dispense: 60 g; Refill: 3  Improved  Clobetasol should not be used on face, axilla, groin    Intrinsic atopic dermatitis  -     triamcinolone acetonide 0.025% (KENALOG) 0.025 % Oint; Use on AA twice daily x 10 days  Dispense: 80 g; Refill: 1  Use tac only as needed  Hyperpigmentation  Neck  Will fade with time  Sunscreen, sun protection           No follow-ups on file.

## 2024-11-12 NOTE — PATIENT INSTRUCTIONS
Mupirocin 5 days of the month in nose and umbilicus   Bleach bathes or Hibiclens 2x a week from neck down

## 2025-05-19 ENCOUNTER — NURSE TRIAGE (OUTPATIENT)
Dept: ADMINISTRATIVE | Facility: CLINIC | Age: 25
End: 2025-05-19
Payer: COMMERCIAL

## 2025-05-19 ENCOUNTER — TELEPHONE (OUTPATIENT)
Dept: DERMATOLOGY | Facility: CLINIC | Age: 25
End: 2025-05-19
Payer: COMMERCIAL

## 2025-05-19 NOTE — TELEPHONE ENCOUNTER
First attempt to call patient, no answer, left a message.   Pt called back from [place]. She started having a rash on her buttocks this past Friday. She has an open wound possibly from scratching on the inside of her right side buttocks cheek near the anus. There is no bleeding or oozing but she now has frequent itching. She does have a rash on the inside of the crack of her buttocks near the perineum. Pt reports hx of eczema that she is being treated for by Dr Maxine Zamora (dermatology) and she prefers to see someone in that clinic today if possible. Care advice is to see in office or VV today. VV declined. Referred to [place] however pt is calling from [place]. [place] nurse able to schedule appt for pt on June 5th with a different provider at the same clinic and placed on the waitlist. Pt advised to contact her PCP (non [facility]) for available appt today. If not available, and she does not hear back from dermatology clinic today, pt advised to go to  and last resort ER. Advised to call back with further questions or concerns. She verbalizes understanding and will be seen today with one of the options available.                     Reason for Disposition   Patient wants to be seen    Additional Information   Negative: Sounds like a life-threatening emergency to the triager   Negative: Fever and localized purple or blood-colored spots or dots that are not from injury or friction   Negative: Fever and localized rash is very painful   Negative: Patient sounds very sick or weak to the triager   Negative: Looks like a boil, infected sore, deep ulcer, or other infected rash (spreading redness, pus)   Negative: Painful rash with multiple small blisters grouped together (i.e., dermatomal distribution or 'band' or 'stripe')   Negative: Localized rash is very painful (no fever)   Negative: Localized purple or blood-colored spots or dots that are not from injury or friction (no fever)   Negative: Lyme disease suspected  (e.g., bull's-eye rash or tick bite / exposure)    Protocols used: Rash or Redness - [id number]

## 2025-05-20 ENCOUNTER — OFFICE VISIT (OUTPATIENT)
Dept: DERMATOLOGY | Facility: CLINIC | Age: 25
End: 2025-05-20
Payer: COMMERCIAL

## 2025-05-20 VITALS — WEIGHT: 148 LBS | BODY MASS INDEX: 23.23 KG/M2 | HEIGHT: 67 IN

## 2025-05-20 DIAGNOSIS — L70.0 ACNE VULGARIS: ICD-10-CM

## 2025-05-20 DIAGNOSIS — L29.0 PRURITUS ANI: Primary | ICD-10-CM

## 2025-05-20 PROCEDURE — 99213 OFFICE O/P EST LOW 20 MIN: CPT | Mod: S$GLB,,, | Performed by: DERMATOLOGY

## 2025-05-20 RX ORDER — CLINDAMYCIN AND BENZOYL PEROXIDE 10; 50 MG/G; MG/G
GEL TOPICAL EVERY MORNING
Qty: 50 G | Refills: 4 | Status: SHIPPED | OUTPATIENT
Start: 2025-05-20 | End: 2026-05-20

## 2025-05-20 RX ORDER — TRETINOIN 0.25 MG/G
CREAM TOPICAL NIGHTLY
Qty: 20 G | Refills: 4 | Status: SHIPPED | OUTPATIENT
Start: 2025-05-20

## 2025-05-20 NOTE — PROGRESS NOTES
"  Subjective:      Patient ID:  Jayme Kraus is a 25 y.o. female who presents for   Chief Complaint   Patient presents with    Rash     Private area      LOV 11/12/24 () Acne Vulgarism, Lichen Simplex, Hyperpigmentation    Patient here today for rash to private area x 4 days.   Unable to identify a trigger, maybe toilet paper at work?  Pt sates rash was burning more so itchy now.  Pt states she's applied Boudreauxs Butt Paste, "Tucks witch hazel wipes", Triple Antibiotic cream and Cortisone 10. No resolution.   Washing with Dove sensitive soap.     C/O irritation to buttocks pt believes its "Hemorrhoids."     Many meds and environmental allergies    Derm Hx:  Denies Phx of NMSC  Denies Fhx of MM    Current Outpatient Medications:   ·  albuterol (PROVENTIL/VENTOLIN HFA) 90 mcg/actuation inhaler, Inhale 2 puffs into the lungs every 4 (four) hours as needed for Wheezing., Disp: 18 g, Rfl: 3  ·  ammonium lactate 12 % Crea, Apply 1 application topically 2 (two) times daily. (Patient not taking: Reported on 11/12/2024), Disp: 385 g, Rfl: 11  ·  benzonatate (TESSALON) 200 MG capsule, Take 200 mg by mouth 3 (three) times daily as needed for Cough., Disp: , Rfl:   ·  blood-glucose meter kit, Use as instructed, Disp: , Rfl:   ·  busPIRone (BUSPAR) 5 MG Tab, Take 5 mg by mouth 2 (two) times daily as needed., Disp: , Rfl:   ·  calcium acetate-aluminum sulf 952-1,347 mg 952-1,347 mg PwPk, Apply 1 packet topically., Disp: , Rfl:   ·  cetirizine (ZYRTEC) 10 MG tablet, Take 10 mg by mouth once daily., Disp: , Rfl:   ·  citalopram (CELEXA) 10 MG tablet, Take 10 mg by mouth., Disp: , Rfl:   ·  clindamycin-benzoyl peroxide (BENZACLIN) gel, Apply topically every morning., Disp: 50 g, Rfl: 4  ·  clobetasol 0.05% (TEMOVATE) 0.05 % Oint, Apply topically 2 (two) times daily., Disp: 60 g, Rfl: 3  ·  COCONUT OIL TOP, Apply topically., Disp: , Rfl:   ·  epinephrine (EPIPEN 2-SHAHRIAR) 0.3 mg/0.3 mL AtIn, Inject 0.3 mLs (0.3 mg total) into " the muscle once., Disp: 0.3 mL, Rfl: 0  ·  fluticasone propionate (FLONASE) 50 mcg/actuation nasal spray, 2 sprays (100 mcg total) by Each Nare route once daily., Disp: 16 g, Rfl: 11  ·  GRASTEK 2,800 BAU Subl, Place 1 tablet under the tongue once daily. (Patient not taking: Reported on 11/12/2024), Disp: 30 tablet, Rfl: 11  ·  hydrocortisone (CORTISONE, HYDROCORTISONE,) 1 % cream, Apply topically 2 (two) times daily., Disp: 454 g, Rfl: 11  ·  ibuprofen (ADVIL,MOTRIN) 200 MG tablet, Take 200 mg by mouth every 6 (six) hours as needed for Pain., Disp: , Rfl:   ·  medroxyPROGESTERone (PROVERA) 10 MG tablet, Take 1 tablet (10 mg total) by mouth once daily., Disp: 30 tablet, Rfl: 3  ·  mineral oil-hydrophil petrolat Oint, Apply topically 4 (four) times daily as needed., Disp: , Rfl:   ·  mometasone (NASONEX) 50 mcg/actuation nasal spray, 2 sprays by Nasal route., Disp: , Rfl:   ·  mupirocin (BACTROBAN) 2 % ointment, Use in nose 5 days of the month, Disp: 22 g, Rfl: 4  ·  oxymetazoline (AFRIN) 0.05 % nasal spray, 2 sprays by Nasal route 2 (two) times daily., Disp: , Rfl:   ·  predniSONE (DELTASONE) 20 MG tablet, Take 20 mg by mouth once daily., Disp: , Rfl:   ·  tranexamic acid (LYSTEDA) 650 mg tablet, TK 2 TS PO TID PRN, Disp: , Rfl: 0  ·  tretinoin (RETIN-A) 0.025 % cream, Apply topically every evening., Disp: 20 g, Rfl: 4  ·  tretinoin (RETIN-A) 0.05 % cream, Apply topically every evening., Disp: 45 g, Rfl: 1  ·  triamcinolone acetonide 0.025% (KENALOG) 0.025 % Oint, Use on AA twice daily x 10 days, Disp: 80 g, Rfl: 1  ·  white petrolatum-mineral oiL Crea, Apply topically as needed., Disp: , Rfl:           Review of Systems   Constitutional:  Negative for fever, chills and fatigue.   Respiratory:  Negative for cough and shortness of breath.    Gastrointestinal:  Negative for nausea and vomiting.   Skin:  Positive for itching and rash.       Objective:   Physical Exam   Constitutional: She appears well-developed and  well-nourished. No distress.   Neurological: She is alert and oriented to person, place, and time. She is not disoriented.   Psychiatric: She has a normal mood and affect.   Skin:               Diagram Legend     Erythematous scaling macule/papule c/w actinic keratosis       Vascular papule c/w angioma      Pigmented verrucoid papule/plaque c/w seborrheic keratosis      Yellow umbilicated papule c/w sebaceous hyperplasia      Irregularly shaped tan macule c/w lentigo     1-2 mm smooth white papules consistent with Milia      Movable subcutaneous cyst with punctum c/w epidermal inclusion cyst      Subcutaneous movable cyst c/w pilar cyst      Firm pink to brown papule c/w dermatofibroma      Pedunculated fleshy papule(s) c/w skin tag(s)      Evenly pigmented macule c/w junctional nevus     Mildly variegated pigmented, slightly irregular-bordered macule c/w mildly atypical nevus      Flesh colored to evenly pigmented papule c/w intradermal nevus       Pink pearly papule/plaque c/w basal cell carcinoma      Erythematous hyperkeratotic cursted plaque c/w SCC      Surgical scar with no sign of skin cancer recurrence      Open and closed comedones      Inflammatory papules and pustules      Verrucoid papule consistent consistent with wart     Erythematous eczematous patches and plaques     Dystrophic onycholytic nail with subungual debris c/w onychomycosis     Umbilicated papule    Erythematous-base heme-crusted tan verrucoid plaque consistent with inflamed seborrheic keratosis     Erythematous Silvery Scaling Plaque c/w Psoriasis     See annotation      Assessment / Plan:        Pruritus ani  Hibiclens in shower daily,  Triple paste  Suspect hemorrhoid as main source of itch  No kids exposure to suspect pinworms    Acne vulgaris  Doing well on current regimen  Refill tretinoin and benzaclin           No follow-ups on file.

## 2025-07-21 ENCOUNTER — TELEPHONE (OUTPATIENT)
Dept: DERMATOLOGY | Facility: CLINIC | Age: 25
End: 2025-07-21

## 2025-07-21 NOTE — TELEPHONE ENCOUNTER
Copied from CRM #[id number]. Topic: General Inquiry - Return Call  >> Jul 21, 2025 10:09 AM Rachel wrote:  Type:  Patient Returning Call    Who Called:patient  Who Left Message for Patient:Renetta  Does the patient know what this is regarding?:  Would the patient rather a call back or a response via [facility]? call  Best Call Back Number:[phone]    Additional Information: Please call patient back call was dropped. Thanks

## 2025-07-21 NOTE — TELEPHONE ENCOUNTER
Copied from CRM #[id number]. Topic: Appointments - Appointment Access  >> Jul 21, 2025  9:23 AM Stevie wrote:  Type:  Reschedule Appointment Request    Caller is requesting a sooner appointment.  Caller declined first available appointment listed below.  Caller will not accept being placed on the waitlist and is requesting a message be sent to doctor.  Name of Caller:Pt  When is the first available appointment? Jan 2026  Symptoms:Rashes  Would the patient rather a call back or a response via [facility]? call  Best Call Back Number:[phone]   Additional Information: PT need to reschedule appt 7/21. Please advise

## 2025-07-21 NOTE — TELEPHONE ENCOUNTER
Patient rescheduled with Dr. Zamora per patients request. Patients line was going in and out and unable to clearly hear.

## 2025-08-26 ENCOUNTER — TELEPHONE (OUTPATIENT)
Dept: DERMATOLOGY | Facility: CLINIC | Age: 25
End: 2025-08-26
Payer: COMMERCIAL